# Patient Record
Sex: FEMALE | Race: WHITE | Employment: OTHER | URBAN - METROPOLITAN AREA
[De-identification: names, ages, dates, MRNs, and addresses within clinical notes are randomized per-mention and may not be internally consistent; named-entity substitution may affect disease eponyms.]

---

## 2018-03-06 ENCOUNTER — APPOINTMENT (OUTPATIENT)
Dept: GENERAL RADIOLOGY | Age: 78
End: 2018-03-06
Payer: MEDICARE

## 2018-03-06 ENCOUNTER — HOSPITAL ENCOUNTER (EMERGENCY)
Age: 78
Discharge: HOME | End: 2018-03-06
Attending: EMERGENCY MEDICINE
Payer: MEDICARE

## 2018-03-06 VITALS
DIASTOLIC BLOOD PRESSURE: 70 MMHG | OXYGEN SATURATION: 99 % | TEMPERATURE: 97 F | WEIGHT: 118 LBS | HEART RATE: 90 BPM | HEIGHT: 63 IN | BODY MASS INDEX: 20.91 KG/M2 | RESPIRATION RATE: 20 BRPM | SYSTOLIC BLOOD PRESSURE: 137 MMHG

## 2018-03-06 DIAGNOSIS — S60.211A CONTUSION OF RIGHT WRIST, INITIAL ENCOUNTER: Primary | ICD-10-CM

## 2018-03-06 PROCEDURE — 73110 X-RAY EXAM OF WRIST: CPT

## 2018-03-06 PROCEDURE — 29125 APPL SHORT ARM SPLINT STATIC: CPT

## 2018-03-06 PROCEDURE — 99283 EMERGENCY DEPT VISIT LOW MDM: CPT

## 2018-03-06 PROCEDURE — 73130 X-RAY EXAM OF HAND: CPT

## 2018-03-06 PROCEDURE — 6370000000 HC RX 637 (ALT 250 FOR IP): Performed by: NURSE PRACTITIONER

## 2018-03-06 RX ORDER — ACETAMINOPHEN 325 MG/1
650 TABLET ORAL ONCE
Status: COMPLETED | OUTPATIENT
Start: 2018-03-06 | End: 2018-03-06

## 2018-03-06 RX ADMIN — ACETAMINOPHEN 650 MG: 325 TABLET ORAL at 18:34

## 2018-03-06 ASSESSMENT — PAIN DESCRIPTION - LOCATION: LOCATION: WRIST

## 2018-03-06 ASSESSMENT — PAIN DESCRIPTION - DESCRIPTORS
DESCRIPTORS: SORE;ACHING
DESCRIPTORS: ACHING;SHARP;SHOOTING;STABBING;THROBBING

## 2018-03-06 ASSESSMENT — PAIN SCALES - GENERAL
PAINLEVEL_OUTOF10: 10
PAINLEVEL_OUTOF10: 6

## 2018-03-06 ASSESSMENT — PAIN DESCRIPTION - FREQUENCY: FREQUENCY: CONTINUOUS

## 2018-03-06 ASSESSMENT — PAIN DESCRIPTION - ORIENTATION
ORIENTATION: RIGHT
ORIENTATION: RIGHT

## 2018-03-06 ASSESSMENT — PAIN SCALES - WONG BAKER: WONGBAKER_NUMERICALRESPONSE: 0

## 2018-03-06 NOTE — ED PROVIDER NOTES
[address] [place] [address] ED  eMERGENCY dEPARTMENT eNCOUnter      Pt Name: Dima Davis  MRN: [id number]  [place] 1940  Date of evaluation: 3/6/2018  Provider: Tien Gilmore NP    [address] [place] [address]       Chief Complaint   Patient presents with    Wrist Pain     rt hand / rt shoulder/ rt arm and rt leg pain since fall yesterday         HISTORY OF PRESENT ILLNESS  (Location/Symptom, Timing/Onset, Context/Setting, Quality, Duration, Modifying Factors, Severity.)   Dima Davis is a 76 y.o. female who presents to the emergency department With complaints of right hand and right wrist pain. She tripped over a [employer] rug Monday morning and fell forward against 2 wooden steps. She had no loss of consciousness. No neck or back pain. She has swelling and tenderness across the wrist along the radial side and swelling with bruising over the thenar area of the right hand. She complains of soreness in the right shoulder. She denies any leg pain. She drove herself to the hospital.      Nursing Notes were reviewed. ALLERGIES     Aspirin and Codeine    CURRENT MEDICATIONS       There are no discharge medications for this patient. PAST MEDICAL HISTORY   History reviewed. No pertinent past medical history. SURGICAL HISTORY     History reviewed. No pertinent surgical history. FAMILY HISTORY     History reviewed. No pertinent family history. No family status information on file. SOCIAL HISTORY      reports that she has quit smoking. She does not have any smokeless tobacco history on file. She reports that she does not drink alcohol or use drugs. REVIEW OF SYSTEMS    (2-9 systems for level 4, 10 or more for level 5)     REVIEW OF SYSTEMS    Constitutional:  Denies fever, chills, or weakness   Eyes:  Denies vision changes  HENT:  Denies headache, ear nose or throat pain.   Respiratory:  Denies cough or shortness of breath   Cardiovascular:  Denies chest pain  GI:  Denies abdominal pain, nausea, vomiting, or diarrhea   Back: Denies any pain of the spinal column. Musculoskeletal:  Complains of soreness to the right shoulder. Complains of right wrist and right hand pain. Skin:  Denies rash or open wounds  : All systems negative except as marked. Except as noted above the remainder of the review of systems was reviewed and negative. PHYSICAL EXAM    (up to 7 for level 4, 8 or more for level 5)     ED Triage Vitals [03/06/18 1808]   BP Temp Temp Source Pulse Resp SpO2 Height Weight   137/70 97 °F (36.1 °C) Oral 90 20 99 % 5' 3\" (1.6 m) 118 lb (53.5 kg)     Physical Exam   Constitutional: She is oriented to person, place, and time. She appears well-developed and well-nourished. Appears slightly uncomfortable secondary to right wrist and right hand pain. HENT:   Head: Normocephalic and atraumatic. Right Ear: External ear normal.   Left Ear: External ear normal.   Nose: Nose normal.   Mouth/Throat: No oropharyngeal exudate. Eyes: Conjunctivae and EOM are normal. Pupils are equal, round, and reactive to light. Neck: Normal range of motion. Neck supple. No tracheal deviation present. Cardiovascular: Normal rate, regular rhythm, normal heart sounds and intact distal pulses. Pulmonary/Chest: Effort normal and breath sounds normal. No respiratory distress. She has no wheezes. She has no rales. Abdominal: Soft. Bowel sounds are normal. She exhibits no distension. There is no tenderness. There is no guarding. Musculoskeletal: She exhibits edema and tenderness. Bruising with swelling noted along the thenar area of the right hand. She maintains good range of motion. Sensation is intact. Capillary refill time is 2-3 seconds. No open wounds. Swelling is noted along the radial side of the right wrist.  Range of motion is limited secondary to pain. There is no ecchymosis or erythema. A bruise is noted in the soft tissue of the right forearm. There is no bony tenderness.   Full range of motion is

## 2018-03-07 NOTE — ED PROVIDER NOTES
[facility] ED  Emergency Department  Faculty Attestation     Pt Name: Dena Vela  MRN: [id number]  [place] 1940  Date of evaluation: 3/6/18    I was personally available for consultation in the Emergency Department. Have reviewed everything on the chart that is available and agree with the documentation provided by the [facility] AND [facility], including discussion about the assessment, treatment plan and disposition. Dena Vela is a 76 y.o. female who presents with Wrist Pain (rt hand / rt shoulder/ rt arm and rt leg pain since fall yesterday)      Vitals:   Vitals:    03/06/18 1808   BP: 137/70   Pulse: 90   Resp: 20   Temp: 97 °F (36.1 °C)   TempSrc: Oral   SpO2: 99%   Weight: 118 lb (53.5 kg)   Height: 5' 3\" (1.6 m)       Impression:   1.  Contusion of right wrist, initial encounter        Go Hidalgo MD  Attending Emergency Physician      (Please note that portions of this note were completed with a voice recognition program.  Efforts were made to edit the dictations but occasionally words are mis-transcribed.)        Go Hidalgo MD  03/06/18 2447

## 2018-05-02 ENCOUNTER — APPOINTMENT (OUTPATIENT)
Dept: GENERAL RADIOLOGY | Age: 78
End: 2018-05-02
Payer: MEDICARE

## 2018-05-02 ENCOUNTER — HOSPITAL ENCOUNTER (EMERGENCY)
Age: 78
Discharge: HOME | End: 2018-05-02
Attending: EMERGENCY MEDICINE
Payer: MEDICARE

## 2018-05-02 VITALS
WEIGHT: 117 LBS | HEART RATE: 84 BPM | DIASTOLIC BLOOD PRESSURE: 87 MMHG | RESPIRATION RATE: 18 BRPM | OXYGEN SATURATION: 95 % | HEIGHT: 64 IN | TEMPERATURE: 98.4 F | BODY MASS INDEX: 19.97 KG/M2 | SYSTOLIC BLOOD PRESSURE: 125 MMHG

## 2018-05-02 DIAGNOSIS — J10.1 INFLUENZA B: Primary | ICD-10-CM

## 2018-05-02 LAB
ABSOLUTE EOS #: 0 K/UL (ref 0–0.4)
ABSOLUTE IMMATURE GRANULOCYTE: ABNORMAL K/UL (ref 0–0.3)
ABSOLUTE LYMPH #: 1.2 K/UL (ref 1–4.8)
ABSOLUTE MONO #: 0.5 K/UL (ref 0.2–0.8)
ALBUMIN SERPL-MCNC: 4.1 G/DL (ref 3.5–5.2)
ALBUMIN/GLOBULIN RATIO: NORMAL (ref 1–2.5)
ALP BLD-CCNC: 49 U/L (ref 35–104)
ALT SERPL-CCNC: 17 U/L (ref 5–33)
ANION GAP SERPL CALCULATED.3IONS-SCNC: 18 MMOL/L (ref 9–17)
AST SERPL-CCNC: 31 U/L
BASOPHILS # BLD: 1 % (ref 0–2)
BASOPHILS ABSOLUTE: 0 K/UL (ref 0–0.2)
BILIRUB SERPL-MCNC: 0.31 MG/DL (ref 0.3–1.2)
BILIRUBIN DIRECT: 0.1 MG/DL
BILIRUBIN, INDIRECT: 0.21 MG/DL (ref 0–1)
BNP INTERPRETATION: NORMAL
BUN BLDV-MCNC: 23 MG/DL (ref 8–23)
BUN/CREAT BLD: 22 (ref 9–20)
CALCIUM SERPL-MCNC: 8.6 MG/DL (ref 8.6–10.4)
CHLORIDE BLD-SCNC: 97 MMOL/L (ref 98–107)
CO2: 21 MMOL/L (ref 20–31)
CREAT SERPL-MCNC: 1.03 MG/DL (ref 0.5–0.9)
DIFFERENTIAL TYPE: ABNORMAL
DIRECT EXAM: ABNORMAL
DIRECT EXAM: NORMAL
EKG ATRIAL RATE: 86 BPM
EKG P-R INTERVAL: 110 MS
EKG Q-T INTERVAL: 364 MS
EKG QRS DURATION: 72 MS
EKG QTC CALCULATION (BAZETT): 435 MS
EKG R AXIS: 7 DEGREES
EKG T AXIS: 12 DEGREES
EKG VENTRICULAR RATE: 86 BPM
EOSINOPHILS RELATIVE PERCENT: 0 % (ref 1–4)
GFR AFRICAN AMERICAN: >60 ML/MIN
GFR NON-AFRICAN AMERICAN: 52 ML/MIN
GFR SERPL CREATININE-BSD FRML MDRD: ABNORMAL ML/MIN/{1.73_M2}
GFR SERPL CREATININE-BSD FRML MDRD: ABNORMAL ML/MIN/{1.73_M2}
GLOBULIN: NORMAL G/DL (ref 1.5–3.8)
GLUCOSE BLD-MCNC: 94 MG/DL (ref 70–99)
HCT VFR BLD CALC: 42.5 % (ref 36–46)
HEMOGLOBIN: 14 G/DL (ref 12–16)
IMMATURE GRANULOCYTES: ABNORMAL %
LYMPHOCYTES # BLD: 29 % (ref 24–44)
Lab: ABNORMAL
Lab: NORMAL
MCH RBC QN AUTO: 29.1 PG (ref 26–34)
MCHC RBC AUTO-ENTMCNC: 33.1 G/DL (ref 31–37)
MCV RBC AUTO: 87.9 FL (ref 80–100)
MONOCYTES # BLD: 12 % (ref 1–7)
MYOGLOBIN: 104 NG/ML (ref 25–58)
NRBC AUTOMATED: ABNORMAL PER 100 WBC
PDW BLD-RTO: 13.3 % (ref 11.5–14.5)
PLATELET # BLD: 176 K/UL (ref 130–400)
PLATELET ESTIMATE: ABNORMAL
PMV BLD AUTO: 8.4 FL (ref 6–12)
POTASSIUM SERPL-SCNC: 3.9 MMOL/L (ref 3.7–5.3)
PRO-BNP: 158 PG/ML
RBC # BLD: 4.83 M/UL (ref 4–5.2)
RBC # BLD: ABNORMAL 10*6/UL
SEG NEUTROPHILS: 58 % (ref 36–66)
SEGMENTED NEUTROPHILS ABSOLUTE COUNT: 2.3 K/UL (ref 1.8–7.7)
SODIUM BLD-SCNC: 136 MMOL/L (ref 135–144)
SPECIMEN DESCRIPTION: ABNORMAL
SPECIMEN DESCRIPTION: NORMAL
STATUS: ABNORMAL
STATUS: NORMAL
TOTAL PROTEIN: 6.7 G/DL (ref 6.4–8.3)
TROPONIN INTERP: ABNORMAL
TROPONIN T: <0.03 NG/ML
WBC # BLD: 3.9 K/UL (ref 3.5–11)
WBC # BLD: ABNORMAL 10*3/UL

## 2018-05-02 PROCEDURE — 6370000000 HC RX 637 (ALT 250 FOR IP): Performed by: NURSE PRACTITIONER

## 2018-05-02 PROCEDURE — 80048 BASIC METABOLIC PNL TOTAL CA: CPT

## 2018-05-02 PROCEDURE — 83880 ASSAY OF NATRIURETIC PEPTIDE: CPT

## 2018-05-02 PROCEDURE — 87804 INFLUENZA ASSAY W/OPTIC: CPT

## 2018-05-02 PROCEDURE — 84484 ASSAY OF TROPONIN QUANT: CPT

## 2018-05-02 PROCEDURE — 83874 ASSAY OF MYOGLOBIN: CPT

## 2018-05-02 PROCEDURE — 71046 X-RAY EXAM CHEST 2 VIEWS: CPT

## 2018-05-02 PROCEDURE — 93005 ELECTROCARDIOGRAM TRACING: CPT

## 2018-05-02 PROCEDURE — 87651 STREP A DNA AMP PROBE: CPT

## 2018-05-02 PROCEDURE — 99284 EMERGENCY DEPT VISIT MOD MDM: CPT

## 2018-05-02 PROCEDURE — 85025 COMPLETE CBC W/AUTO DIFF WBC: CPT

## 2018-05-02 PROCEDURE — 80076 HEPATIC FUNCTION PANEL: CPT

## 2018-05-02 PROCEDURE — 2580000003 HC RX 258: Performed by: NURSE PRACTITIONER

## 2018-05-02 RX ORDER — OSELTAMIVIR PHOSPHATE 30 MG/1
30 CAPSULE ORAL 2 TIMES DAILY
Qty: 10 CAPSULE | Refills: 0 | Status: SHIPPED | OUTPATIENT
Start: 2018-05-02 | End: 2018-05-07

## 2018-05-02 RX ORDER — OSELTAMIVIR PHOSPHATE 30 MG/1
30 CAPSULE ORAL ONCE
Status: COMPLETED | OUTPATIENT
Start: 2018-05-02 | End: 2018-05-02

## 2018-05-02 RX ORDER — ACETAMINOPHEN 500 MG
1000 TABLET ORAL EVERY 6 HOURS PRN
Qty: 30 TABLET | Refills: 0 | Status: SHIPPED | OUTPATIENT
Start: 2018-05-02

## 2018-05-02 RX ORDER — BENZONATATE 200 MG/1
200 CAPSULE ORAL 3 TIMES DAILY PRN
Qty: 30 CAPSULE | Refills: 0 | Status: SHIPPED | OUTPATIENT
Start: 2018-05-02

## 2018-05-02 RX ORDER — 0.9 % SODIUM CHLORIDE 0.9 %
1000 INTRAVENOUS SOLUTION INTRAVENOUS ONCE
Status: COMPLETED | OUTPATIENT
Start: 2018-05-02 | End: 2018-05-02

## 2018-05-02 RX ADMIN — SODIUM CHLORIDE 1000 ML: 9 INJECTION, SOLUTION INTRAVENOUS at 16:03

## 2018-05-02 RX ADMIN — OSELTAMIVIR PHOSPHATE 30 MG: 30 CAPSULE ORAL at 16:42

## 2018-05-02 ASSESSMENT — ENCOUNTER SYMPTOMS
DIARRHEA: 0
COUGH: 1
ABDOMINAL PAIN: 0
SORE THROAT: 1
SHORTNESS OF BREATH: 0
RHINORRHEA: 1
NAUSEA: 1
SINUS PRESSURE: 0
VOMITING: 1
COLOR CHANGE: 0

## 2018-05-02 ASSESSMENT — PAIN SCALES - GENERAL: PAINLEVEL_OUTOF10: 10

## 2018-05-02 ASSESSMENT — PAIN DESCRIPTION - LOCATION: LOCATION: THROAT;GENERALIZED;HEAD

## 2018-05-02 ASSESSMENT — PAIN DESCRIPTION - PAIN TYPE: TYPE: ACUTE PAIN

## 2018-05-03 LAB
DIRECT EXAM: NORMAL
DIRECT EXAM: NORMAL
Lab: NORMAL
SPECIMEN DESCRIPTION: NORMAL
SPECIMEN DESCRIPTION: NORMAL
STATUS: NORMAL

## 2018-10-09 ENCOUNTER — OFFICE VISIT (OUTPATIENT)
Dept: PODIATRY | Age: 78
End: 2018-10-09
Payer: MEDICARE

## 2018-10-09 VITALS — HEIGHT: 64 IN | BODY MASS INDEX: 20.32 KG/M2 | WEIGHT: 119 LBS

## 2018-10-09 DIAGNOSIS — B35.1 DERMATOPHYTOSIS OF NAIL: ICD-10-CM

## 2018-10-09 DIAGNOSIS — R26.2 TROUBLE WALKING: ICD-10-CM

## 2018-10-09 DIAGNOSIS — M79.671 PAIN IN BOTH FEET: Primary | ICD-10-CM

## 2018-10-09 DIAGNOSIS — M79.672 PAIN IN BOTH FEET: Primary | ICD-10-CM

## 2018-10-09 DIAGNOSIS — I73.9 PERIPHERAL VASCULAR DISEASE (HCC): ICD-10-CM

## 2018-10-09 PROCEDURE — 1090F PRES/ABSN URINE INCON ASSESS: CPT | Performed by: PODIATRIST

## 2018-10-09 PROCEDURE — 11721 DEBRIDE NAIL 6 OR MORE: CPT | Performed by: PODIATRIST

## 2018-10-09 PROCEDURE — G8420 CALC BMI NORM PARAMETERS: HCPCS | Performed by: PODIATRIST

## 2018-10-09 PROCEDURE — 1123F ACP DISCUSS/DSCN MKR DOCD: CPT | Performed by: PODIATRIST

## 2018-10-09 PROCEDURE — 1101F PT FALLS ASSESS-DOCD LE1/YR: CPT | Performed by: PODIATRIST

## 2018-10-09 PROCEDURE — 4040F PNEUMOC VAC/ADMIN/RCVD: CPT | Performed by: PODIATRIST

## 2018-10-09 PROCEDURE — 1036F TOBACCO NON-USER: CPT | Performed by: PODIATRIST

## 2018-10-09 PROCEDURE — G8400 PT W/DXA NO RESULTS DOC: HCPCS | Performed by: PODIATRIST

## 2018-10-09 PROCEDURE — G8427 DOCREV CUR MEDS BY ELIG CLIN: HCPCS | Performed by: PODIATRIST

## 2018-10-09 PROCEDURE — 99203 OFFICE O/P NEW LOW 30 MIN: CPT | Performed by: PODIATRIST

## 2018-10-09 PROCEDURE — G8484 FLU IMMUNIZE NO ADMIN: HCPCS | Performed by: PODIATRIST

## 2022-06-02 ENCOUNTER — HOSPITAL ENCOUNTER (EMERGENCY)
Age: 82
Discharge: HOME | End: 2022-06-02
Attending: EMERGENCY MEDICINE
Payer: COMMERCIAL

## 2022-06-02 VITALS
BODY MASS INDEX: 19.16 KG/M2 | TEMPERATURE: 98 F | HEART RATE: 80 BPM | SYSTOLIC BLOOD PRESSURE: 143 MMHG | HEIGHT: 65 IN | DIASTOLIC BLOOD PRESSURE: 71 MMHG | OXYGEN SATURATION: 98 % | WEIGHT: 115 LBS | RESPIRATION RATE: 16 BRPM

## 2022-06-02 DIAGNOSIS — H18.892 CORNEAL IRRITATION OF LEFT EYE: Primary | ICD-10-CM

## 2022-06-02 PROCEDURE — 99283 EMERGENCY DEPT VISIT LOW MDM: CPT

## 2022-06-02 RX ORDER — ERYTHROMYCIN 5 MG/G
OINTMENT OPHTHALMIC
Qty: 1 G | Refills: 0 | Status: SHIPPED | OUTPATIENT
Start: 2022-06-02 | End: 2022-06-12

## 2022-06-02 ASSESSMENT — VISUAL ACUITY
OU: 20/40
OS: 20/100
OD: 20/40

## 2022-06-02 ASSESSMENT — PAIN DESCRIPTION - LOCATION: LOCATION: EYE

## 2022-06-02 ASSESSMENT — PAIN DESCRIPTION - FREQUENCY: FREQUENCY: CONTINUOUS

## 2022-06-02 ASSESSMENT — ENCOUNTER SYMPTOMS
DOUBLE VISION: 0
EYE DISCHARGE: 1
PHOTOPHOBIA: 0
VOMITING: 0
BLURRED VISION: 0
EYE PAIN: 1
BLIND SPOTS: 0

## 2022-06-02 ASSESSMENT — PAIN DESCRIPTION - ORIENTATION: ORIENTATION: LEFT

## 2022-06-02 ASSESSMENT — PAIN - FUNCTIONAL ASSESSMENT: PAIN_FUNCTIONAL_ASSESSMENT: 0-10

## 2022-06-02 ASSESSMENT — PAIN SCALES - GENERAL: PAINLEVEL_OUTOF10: 10

## 2022-06-02 ASSESSMENT — PAIN DESCRIPTION - DESCRIPTORS: DESCRIPTORS: BURNING

## 2022-06-02 NOTE — ED PROVIDER NOTES
[address] [place] [address] ED  eMERGENCY dEPARTMENT eNCOUnter      Pt Name: Ashlee Nava  MRN: [id number]  [place] 1940  Date of evaluation: 6/2/22      CHIEF COMPLAINT       Chief Complaint   Patient presents with    Foreign Body in Eye     onset this am, left eye trimming a burning bush         HISTORY OF PRESENT ILLNESS    Shamir Fajardo is a 78 y.o. female who presents complaining of left  eye irritation after trimming a bush earlier this morning  The history is provided by the patient. Eye Problem  Affected eye: Left eye irritation after she was trimming a bush this morning outside her home she reports that she may have gotten some of the leaves or other substance from the plant into her left eye. Quality:  Burning  Severity:  Mild  Onset quality:  Sudden  Duration:  6 hours  Timing:  Intermittent  Progression:  Waxing and waning  Chronicity:  New  Context: foreign body    Foreign body: plant material.  Relieved by:  Nothing  Worsened by:  Nothing  Ineffective treatments:  None tried  Associated symptoms: discharge    Associated symptoms: no blurred vision, no decreased vision, no double vision, no numbness, no photophobia, no scotomas, no tingling and no vomiting        REVIEW OF SYSTEMS       Review of Systems   Eyes: Positive for pain and discharge. Negative for blurred vision, double vision and photophobia. Gastrointestinal: Negative for vomiting. Neurological: Negative for tingling and numbness. All other systems reviewed and are negative. PAST MEDICAL HISTORY   History reviewed. No pertinent past medical history. SURGICAL HISTORY     History reviewed. No pertinent surgical history.     CURRENT MEDICATIONS       Previous Medications    ACETAMINOPHEN (APAP EXTRA STRENGTH) 500 MG TABLET    Take 2 tablets by mouth every 6 hours as needed for Pain    BENZONATATE (TESSALON) 200 MG CAPSULE    Take 1 capsule by mouth 3 times daily as needed for Cough       ALLERGIES     is allergic to aspirin and codeine. FAMILY HISTORY     has no family status information on file. SOCIAL HISTORY      reports that she has quit smoking. She has never used smokeless tobacco. She reports that she does not drink alcohol and does not use drugs. PHYSICAL EXAM     INITIAL VITALS: BP (!) 143/71   Pulse 80   Temp 98 °F (36.7 °C) (Oral)   Resp 16   Ht 5' 5\" (1.651 m)   Wt 115 lb (52.2 kg)   SpO2 98%   BMI 19.14 kg/m²      Physical Exam  Vitals and nursing note reviewed. Constitutional:       Appearance: She is well-developed. HENT:      Head: Normocephalic and atraumatic. Eyes:      General: Lids are normal. Lids are everted, no foreign bodies appreciated. Left eye: No foreign body or discharge. Extraocular Movements: Extraocular movements intact. Conjunctiva/sclera: Conjunctivae normal.      Pupils: Pupils are equal, round, and reactive to light. Comments: No obvious foreign body noted there is skin tag noted to the left upper lid which is chronic in nature. No obvious corneal abrasion. No ulceration. Extraocular muscles are intact pupils are equal round reactive to light   Cardiovascular:      Rate and Rhythm: Normal rate and regular rhythm. Heart sounds: Normal heart sounds. Pulmonary:      Effort: Pulmonary effort is normal.      Breath sounds: Normal breath sounds. Neurological:      Mental Status: She is alert and oriented to person, place, and time. MEDICAL DECISION MAKING:     Left eye irrigated no foreign bodies no corneal abrasion no corneal ulcers extraocular muscles intact pupils are equal round reactive to light. Recommend erythromycin eye ointment sunglasses if in bright light. Cool compresses to the area follow-up with ophthalmology on Monday if needed return if worse or other concerns.     DIAGNOSTIC RESULTS     EKG: All EKG's are interpreted by the Emergency Department Physician who either signs or Co-signs this chart in the absence of acardiologist.        RADIOLOGY:Allplain film, CT, MRI, and formal ultrasound images (except ED bedside ultrasound) are read by the radiologist and the images and interpretations are directly viewed by the emergency physician. LABS:All lab results were reviewed by myself, and all abnormals are listed below. Labs Reviewed - No data to display      EMERGENCY DEPARTMENT COURSE:   Vitals:    Vitals:    06/02/22 1217   BP: (!) 143/71   Pulse: 80   Resp: 16   Temp: 98 °F (36.7 °C)   TempSrc: Oral   SpO2: 98%   Weight: 115 lb (52.2 kg)   Height: 5' 5\" (1.651 m)       The patient was given the following medications while in the emergency department:  Orders Placed This Encounter   Medications    erythromycin (ROMYCIN) 5 MG/GM ophthalmic ointment     Sig: Apply half-inch ribbon to the lower left eyelid twice daily for 3 to 5 days     Dispense:  1 g     Refill:  0       -------------------------      CRITICAL CARE:     CONSULTS:  None    PROCEDURES:  Procedures    FINAL IMPRESSION      1.  Corneal irritation of left eye          DISPOSITION/PLAN   DISPOSITION Decision To Discharge 06/02/2022 02:34:20 PM      PATIENT REFERREDTO:  Valdemar Mai MD  [address] [place] [address]  [phone]    Schedule an appointment as soon as possible for a visit   As needed    [facility] ED  [address] [facility]  [phone]    If symptoms worsen      DISCHARGEMEDICATIONS:  New Prescriptions    ERYTHROMYCIN [facility]) 5 MG/GM OPHTHALMIC OINTMENT    Apply half-inch ribbon to the lower left eyelid twice daily for 3 to 5 days       (Please note that portions of this note were completed with a voice recognition program.  Efforts were made to edit thedictations but occasionally words are mis-transcribed.)    CLEMENTINA Moscoso PA-C  06/02/22 3244

## 2022-12-05 ENCOUNTER — APPOINTMENT (OUTPATIENT)
Dept: GENERAL RADIOLOGY | Age: 82
End: 2022-12-05
Payer: COMMERCIAL

## 2022-12-05 ENCOUNTER — HOSPITAL ENCOUNTER (EMERGENCY)
Age: 82
Discharge: HOME | End: 2022-12-05
Attending: EMERGENCY MEDICINE
Payer: COMMERCIAL

## 2022-12-05 VITALS
HEIGHT: 65 IN | OXYGEN SATURATION: 98 % | DIASTOLIC BLOOD PRESSURE: 75 MMHG | SYSTOLIC BLOOD PRESSURE: 128 MMHG | TEMPERATURE: 98.2 F | HEART RATE: 96 BPM | BODY MASS INDEX: 17.49 KG/M2 | RESPIRATION RATE: 18 BRPM | WEIGHT: 105 LBS
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
## 2022-12-05 DIAGNOSIS — M79.605 LEFT LEG PAIN: ICD-10-CM

## 2022-12-05 PROCEDURE — 73610 X-RAY EXAM OF ANKLE: CPT

## 2022-12-05 PROCEDURE — 73630 X-RAY EXAM OF FOOT: CPT

## 2022-12-05 PROCEDURE — 99283 EMERGENCY DEPT VISIT LOW MDM: CPT

## 2022-12-05 PROCEDURE — 73502 X-RAY EXAM HIP UNI 2-3 VIEWS: CPT

## 2022-12-05 PROCEDURE — 73562 X-RAY EXAM OF KNEE 3: CPT

## 2022-12-05 ASSESSMENT — PAIN - FUNCTIONAL ASSESSMENT: PAIN_FUNCTIONAL_ASSESSMENT: 0-10

## 2022-12-05 ASSESSMENT — PAIN SCALES - GENERAL: PAINLEVEL_OUTOF10: 8

## 2022-12-05 ASSESSMENT — PAIN DESCRIPTION - LOCATION: LOCATION: LEG

## 2022-12-06 ASSESSMENT — ENCOUNTER SYMPTOMS
VOMITING: 0
CHEST TIGHTNESS: 0
APNEA: 0
SHORTNESS OF BREATH: 0
CONSTIPATION: 0
DIARRHEA: 0
ABDOMINAL DISTENTION: 0
EYES NEGATIVE: 1
COLOR CHANGE: 0
SINUS PAIN: 0

## 2022-12-06 NOTE — ED NOTES
Dr Casey Him updates pt. Ace wraps applied to left knee and left ankle. Tolerated well. Instructed on proper elevation of left foot and ace wraps. Verbalized understanding.      Ailyn Marcus RN  12/05/22 1674

## 2022-12-06 NOTE — ED PROVIDER NOTES
EMERGENCY DEPARTMENT ENCOUNTER    Pt Name: Hitesh Leon  MRN: [id number]  [place] 1940  Date of evaluation: 12/5/22  CHIEF COMPLAINT       Chief Complaint   Patient presents with    Leg Pain     Left knee, ankle and foot pain after injuring it today, ambulating slowly with cane     HISTORY OF PRESENT ILLNESS   58-year-old female presents emergency room for left foot lower leg and knee pain after a fall. Patient reports that she had accidentally hit her foot on a wood stool board in the house. She did not end up falling on the leg. No head injury or loss of consciousness. She reports isolated left lower leg pain. Pain is most severe to the lower leg and ankle. She does have some pain to the knee as well. REVIEW OF SYSTEMS     Review of Systems   Constitutional:  Negative for activity change, chills and diaphoresis. HENT:  Negative for congestion, sinus pain and tinnitus. Eyes: Negative. Respiratory:  Negative for apnea, chest tightness and shortness of breath. Gastrointestinal:  Negative for abdominal distention, constipation, diarrhea and vomiting. Genitourinary:  Negative for difficulty urinating and frequency. Musculoskeletal:  Negative for arthralgias and myalgias. Skin:  Negative for color change and rash. Neurological:  Negative for dizziness. Hematological: Negative. Psychiatric/Behavioral: Negative. PASTMEDICAL HISTORY   History reviewed. No pertinent past medical history. Past Problem List  There is no problem list on file for this patient. SURGICAL HISTORY     History reviewed. No pertinent surgical history.   CURRENT MEDICATIONS       Discharge Medication List as of 12/5/2022 11:06 PM        CONTINUE these medications which have NOT CHANGED    Details   benzonatate (TESSALON) 200 MG capsule Take 1 capsule by mouth 3 times daily as needed for Cough, Disp-30 capsule, R-0Print      acetaminophen (APAP EXTRA STRENGTH) 500 MG tablet Take 2 tablets by mouth every 6 hours as needed for Pain, Disp-30 tablet, R-0Print           ALLERGIES     is allergic to aspirin and codeine. FAMILY HISTORY     has no family status information on file. SOCIAL HISTORY       Social History     Tobacco Use    Smoking status: Former    Smokeless tobacco: Never   Vaping Use    Vaping Use: Never used   Substance Use Topics    Alcohol use: No    Drug use: No     PHYSICAL EXAM     INITIAL VITALS: /75   Pulse 96   Temp 98.2 °F (36.8 °C) (Oral)   Resp 18   Ht 5' 5\" (1.651 m)   Wt 105 lb (47.6 kg)   SpO2 98%   BMI 17.47 kg/m²    Physical Exam  Constitutional:       General: She is not in acute distress. Appearance: She is well-developed. HENT:      Head: Normocephalic. Eyes:      Pupils: Pupils are equal, round, and reactive to light. Cardiovascular:      Rate and Rhythm: Normal rate and regular rhythm. Heart sounds: Normal heart sounds. Pulmonary:      Effort: Pulmonary effort is normal. No respiratory distress. Breath sounds: Normal breath sounds. Abdominal:      General: Bowel sounds are normal.      Palpations: Abdomen is soft. Tenderness: There is no abdominal tenderness. Musculoskeletal:         General: Normal range of motion. Legs:       Comments: Patient has some tenderness to the left leg ankle and knee. She does have some slight bruising with very mild soft tissue swelling no deformity   Skin:     General: Skin is warm and dry. Neurological:      Mental Status: She is alert and oriented to person, place, and time. MEDICAL DECISION MAKIN-year-old female presenting to the emergency room with left lower leg and knee pain after a fall. Patient has some mild soft tissue swelling. No marked deformity. X-rays are negative for acute bony process. Patient is ambulatory. Patient encouraged to follow-up with primary care provider. Clinically patient can be discharged here from the ED.     CRITICAL CARE: PROCEDURES:    Procedures    DIAGNOSTIC RESULTS   EKG:All EKG's are interpreted by the Emergency Department Physician who either signs or Co-signs this chart in the absence of a cardiologist.        RADIOLOGY:All plain film, CT, MRI, and formal ultrasound images (except ED bedside ultrasound) are read by the radiologist, see reports below, unless otherwisenoted in MDM or here. XR HIP 2-3 VW W PELVIS LEFT   Final Result   No acute fracture or dislocation. The study is limited due to osteopenia. If the patient cannot bear weight, further evaluation with CT could be   considered. XR KNEE LEFT (3 VIEWS)   Final Result   Left knee: No acute abnormality identified. Severe osteoarthrosis. Left ankle: No acute abnormality detected. Left foot: No acute abnormality detected. XR FOOT LEFT (MIN 3 VIEWS)   Final Result   Left knee: No acute abnormality identified. Severe osteoarthrosis. Left ankle: No acute abnormality detected. Left foot: No acute abnormality detected. XR ANKLE LEFT (MIN 3 VIEWS)   Final Result   Left knee: No acute abnormality identified. Severe osteoarthrosis. Left ankle: No acute abnormality detected. Left foot: No acute abnormality detected. LABS: All lab results were reviewed by myself, and all abnormals are listed below. Labs Reviewed - No data to display    EMERGENCY DEPARTMENTCOURSE:         Vitals:    Vitals:    12/05/22 2012   BP: 128/75   Pulse: 96   Resp: 18   Temp: 98.2 °F (36.8 °C)   TempSrc: Oral   SpO2: 98%   Weight: 105 lb (47.6 kg)   Height: 5' 5\" (1.651 m)       The patient was given the following medications while in the emergency department:  No orders of the defined types were placed in this encounter. CONSULTS:  None    FINAL IMPRESSION      1. Fall, initial encounter    2.  Left leg pain          DISPOSITION/PLAN   DISPOSITION Decision To Discharge 12/05/2022 11:02:19 PM      PATIENT REFERRED TO:  No follow-up provider specified. DISCHARGE MEDICATIONS:  Discharge Medication List as of 12/5/2022 11:06 PM        Rhiannon Pichardo MD  Attending Emergency Physician      Care during this encounter was due to an unprecedented national emergency due to COVID-19.              Sejal Christensen MD  12/06/22 Lindsey Martin

## 2022-12-06 NOTE — ED NOTES
To room per wheelchair. Assisted to bed. States was at home and ran into the bottom leg of the ladder. \"I hit my toes real hard on my left foot. The pain went right up my left leg. It tripped me and I fell. \" States did not hit head nor had LOC. Landed on left knee.      Felisa Valle RN  12/05/22 2588

## 2022-12-06 NOTE — DISCHARGE INSTRUCTIONS
I recommend Tylenol at home to help with pain. I would recommend icing areas that are sore and swollen. I do recommend follow-up with your doctor.

## 2025-06-30 ENCOUNTER — HOSPITAL ENCOUNTER (OUTPATIENT)
Age: 85
Setting detail: SPECIMEN
Discharge: HOME | End: 2025-06-30

## 2025-06-30 LAB
ANION GAP SERPL CALCULATED.3IONS-SCNC: 10 MMOL/L (ref 9–16)
BUN SERPL-MCNC: 19 MG/DL (ref 8–23)
CALCIUM SERPL-MCNC: 8.4 MG/DL (ref 8.8–10.2)
CHLORIDE SERPL-SCNC: 103 MMOL/L (ref 98–107)
CO2 SERPL-SCNC: 27 MMOL/L (ref 20–31)
CREAT SERPL-MCNC: 0.6 MG/DL (ref 0.5–0.9)
ERYTHROCYTE [DISTWIDTH] IN BLOOD BY AUTOMATED COUNT: 17.5 % (ref 11.8–14.4)
GFR, ESTIMATED: 87 ML/MIN/1.73M2
GLUCOSE SERPL-MCNC: 90 MG/DL (ref 82–115)
HCT VFR BLD AUTO: 27.2 % (ref 36.3–47.1)
HGB BLD-MCNC: 8.6 G/DL (ref 11.9–15.1)
MCH RBC QN AUTO: 30.2 PG (ref 25.2–33.5)
MCHC RBC AUTO-ENTMCNC: 31.6 G/DL (ref 28.4–34.8)
MCV RBC AUTO: 95.4 FL (ref 82.6–102.9)
NRBC BLD-RTO: 0 PER 100 WBC
PLATELET # BLD AUTO: 396 K/UL (ref 138–453)
PMV BLD AUTO: 9.4 FL (ref 8.1–13.5)
POTASSIUM SERPL-SCNC: 4 MMOL/L (ref 3.7–5.3)
RBC # BLD AUTO: 2.85 M/UL (ref 3.95–5.11)
SODIUM SERPL-SCNC: 139 MMOL/L (ref 136–145)
WBC OTHER # BLD: 8 K/UL (ref 3.5–11.3)

## 2025-06-30 PROCEDURE — 85027 COMPLETE CBC AUTOMATED: CPT

## 2025-06-30 PROCEDURE — 36415 COLL VENOUS BLD VENIPUNCTURE: CPT

## 2025-06-30 PROCEDURE — 80048 BASIC METABOLIC PNL TOTAL CA: CPT

## 2025-07-07 ENCOUNTER — HOSPITAL ENCOUNTER (OUTPATIENT)
Age: 85
Setting detail: SPECIMEN
Discharge: HOME | End: 2025-07-07

## 2025-07-07 LAB
ANION GAP SERPL CALCULATED.3IONS-SCNC: 12 MMOL/L (ref 9–16)
BUN SERPL-MCNC: 16 MG/DL (ref 8–23)
CALCIUM SERPL-MCNC: 8.7 MG/DL (ref 8.8–10.2)
CHLORIDE SERPL-SCNC: 103 MMOL/L (ref 98–107)
CO2 SERPL-SCNC: 24 MMOL/L (ref 20–31)
CREAT SERPL-MCNC: 0.6 MG/DL (ref 0.5–0.9)
ERYTHROCYTE [DISTWIDTH] IN BLOOD BY AUTOMATED COUNT: 17.2 % (ref 11.8–14.4)
GFR, ESTIMATED: 87 ML/MIN/1.73M2
GLUCOSE SERPL-MCNC: 93 MG/DL (ref 82–115)
HCT VFR BLD AUTO: 28 % (ref 36.3–47.1)
HGB BLD-MCNC: 8.9 G/DL (ref 11.9–15.1)
MCH RBC QN AUTO: 30.3 PG (ref 25.2–33.5)
MCHC RBC AUTO-ENTMCNC: 31.8 G/DL (ref 28.4–34.8)
MCV RBC AUTO: 95.2 FL (ref 82.6–102.9)
NRBC BLD-RTO: 0 PER 100 WBC
PLATELET # BLD AUTO: 393 K/UL (ref 138–453)
PMV BLD AUTO: 9.8 FL (ref 8.1–13.5)
POTASSIUM SERPL-SCNC: 4.2 MMOL/L (ref 3.7–5.3)
RBC # BLD AUTO: 2.94 M/UL (ref 3.95–5.11)
SODIUM SERPL-SCNC: 139 MMOL/L (ref 136–145)
WBC OTHER # BLD: 5.1 K/UL (ref 3.5–11.3)

## 2025-07-07 PROCEDURE — 80048 BASIC METABOLIC PNL TOTAL CA: CPT

## 2025-07-07 PROCEDURE — 36415 COLL VENOUS BLD VENIPUNCTURE: CPT

## 2025-07-07 PROCEDURE — 85027 COMPLETE CBC AUTOMATED: CPT
